# Patient Record
Sex: FEMALE | URBAN - METROPOLITAN AREA
[De-identification: names, ages, dates, MRNs, and addresses within clinical notes are randomized per-mention and may not be internally consistent; named-entity substitution may affect disease eponyms.]

---

## 2023-10-23 ENCOUNTER — OFFICE VISIT (OUTPATIENT)
Age: 38
End: 2023-10-23

## 2023-10-23 VITALS
TEMPERATURE: 98.3 F | OXYGEN SATURATION: 98 % | SYSTOLIC BLOOD PRESSURE: 128 MMHG | WEIGHT: 267 LBS | DIASTOLIC BLOOD PRESSURE: 82 MMHG | HEART RATE: 97 BPM

## 2023-10-23 DIAGNOSIS — M79.672 PAIN OF LEFT HEEL: Primary | ICD-10-CM

## 2023-10-23 DIAGNOSIS — M77.52 BONE SPUR OF LEFT FOOT: ICD-10-CM

## 2023-10-23 NOTE — PROGRESS NOTES
positive findings     Physical Exam  Constitutional:       Appearance: She is obese. HENT:      Head: Normocephalic and atraumatic. Cardiovascular:      Rate and Rhythm: Normal rate and regular rhythm. Pulses:           Dorsalis pedis pulses are 2+ on the left side. Posterior tibial pulses are 2+ on the left side. Pulmonary:      Effort: Pulmonary effort is normal.      Breath sounds: Normal breath sounds. Musculoskeletal:      Left foot: Normal range of motion. No deformity, bunion, Charcot foot, foot drop or prominent metatarsal heads. Feet:    Feet:      Left foot:      Skin integrity: Skin integrity normal.      Toenail Condition: Left toenails are normal.   Skin:     General: Skin is warm and dry. Neurological:      Mental Status: She is alert and oriented to person, place, and time. Psychiatric:         Mood and Affect: Mood normal.         Reviewed AVS with patient. All questions answered. An electronic signature was used to authenticate this note.     --EMILY Alfonso NP

## 2023-10-24 ENCOUNTER — TELEPHONE (OUTPATIENT)
Age: 38
End: 2023-10-24

## 2023-10-24 DIAGNOSIS — M77.52 BONE SPUR OF LEFT FOOT: Primary | ICD-10-CM

## 2023-10-24 NOTE — TELEPHONE ENCOUNTER
Patient called today regarding referral that was given yesterday. Unable to be seen due to lack of insurance.  Patient would like another referral.

## 2023-10-24 NOTE — TELEPHONE ENCOUNTER
Please call patient and let them know an external referral to [address] was place. Office was called and they do accept self pay. They have multiple locations and can be seen by any provider. She can  paperwork at  or view in my chart.  Call [phone] to schedule allow 24 hours for referral to go through